# Patient Record
Sex: MALE | Employment: OTHER
[De-identification: names, ages, dates, MRNs, and addresses within clinical notes are randomized per-mention and may not be internally consistent; named-entity substitution may affect disease eponyms.]

---

## 2024-02-01 ENCOUNTER — HOSPITAL ENCOUNTER (OUTPATIENT)
Facility: HOSPITAL | Age: 54
Setting detail: RECURRING SERIES
Discharge: HOME | End: 2024-02-04
Payer: OTHER GOVERNMENT

## 2024-02-01 PROCEDURE — 97110 THERAPEUTIC EXERCISES: CPT

## 2024-02-01 PROCEDURE — 97161 PT EVAL LOW COMPLEX 20 MIN: CPT

## 2024-02-01 NOTE — PROGRESS NOTES
PT DAILY TREATMENT NOTE/LUMBAR EVAL     Patient Name: Magdiel Torres    Date: 2024    : 1970  Insurance: Payor: [employer] / Plan: [employer] / Product Type: *No Product type* /      Patient  verified yes     Visit #   Current / Total 1 12   Time   In / Out 4:40 5:15   Pain   In / Out 0/10 0/10   Subjective Functional Status/Changes: See below       Treatment Area: Other low back pain [M54.59]  Pain in left hip [M25.552]  SUBJECTIVE  Pain Level (0-10 scale): 0/10  []constant []intermittent []improving []worsening []no change since onset    Any medication changes, allergies to medications, adverse drug reactions, diagnosis change, or new procedure performed?: [x] No    [] Yes (see summary sheet for update)  Subjective functional status/changes:       Mechanism of Injury: on and off pain. Feels normal sometimes and then has pain for 2-3 weeks. Reports he was pretty active and not sure what sets it off. Started 7-8 years ago. With worsening over time. Pain down to his left calf at time. Denies feeling pain not numbness. Describes pulsing pain. Less pain with ice and pressure point socks help. Piriformis stretch helps a lot typically. Increased pain with prolonged sitting. Likes to exercise and golf. Likes to bike. 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 (see flow sheet as applicable)     Details if applicable:  see flow sheet          Details if applicable:            Details if applicable:            Details if applicable:            Details if applicable:

## 2024-02-02 NOTE — THERAPY EVALUATION
[facility]  [address] [place] [address] Ph:[phone] Fx: [phone]  Plan of Care / Statement of Necessity for Physical Therapy Services     Patient Name: Magdiel Torres : 1970   Medical   Diagnosis: Other low back pain [M54.59]  Pain in left hip [M25.552] Treatment Diagnosis: M25.552  LEFT HIP PAIN  and M54.59  OTHER LOWER BACK PAIN      Onset Date: 7-8 years ago Payor :  Payor:  EAST / Plan:  [employer] / Product Type: *No Product type* /    Referral Source: [facility] Start of Care (SOC): 2024   Prior Hospitalization: See medical history Provider #: [id number]   Prior Level of Function: Ind with ambulation, Ind with ADLs, working from home   Comorbidities: None reports     Assessment / key information:    Pt. Is a 53 year old male c/o back and low back pain that began 7-8 years ago and has worsened over time. He reports having bouts of increased pain in back and left leg that can last for 2-3 weeks and then go away. He reports over time these episodes of occurred more frequently. He has been exercising at home and takes frequent breaks when sitting at work. He reports left leg pain can go down to his calf at times. He presents with good lumbar AROM in all directions. No increase in radicular symptoms with repeated movements in standing or laying down. He has decreased left LE mobility during SLR and slump tests. He also demonstrates decreased left hip ext/abd/add strength at 4/5 on left compared to 4+/5 on right side. Positive Ely test bilaterally. He has tightness and tenderness to palpation over left lumbar paraspinals, QL, and glute med. Skilled PT is medically necessary in order to improve LE strength and core stability for decreased pain and improved quality of life.     Evaluation Complexity:  History:  LOW Complexity : Zero comorbidities / personal factors that will impact the outcome / POC;

## 2024-02-27 ENCOUNTER — APPOINTMENT (OUTPATIENT)
Facility: HOSPITAL | Age: 54
End: 2024-02-27
Payer: OTHER GOVERNMENT

## 2024-03-07 ENCOUNTER — HOSPITAL ENCOUNTER (OUTPATIENT)
Facility: HOSPITAL | Age: 54
Setting detail: RECURRING SERIES
Discharge: HOME | End: 2024-03-10
Payer: OTHER GOVERNMENT

## 2024-03-07 PROCEDURE — 97110 THERAPEUTIC EXERCISES: CPT

## 2024-03-07 PROCEDURE — 97112 NEUROMUSCULAR REEDUCATION: CPT

## 2024-03-07 NOTE — PROGRESS NOTES
Measures/Assessment    FOTO: 79 points  Left: hip MMT ext: 4+/5 abd: 4+/5 add: 4+/5  % improvement in symptoms: pt. Reports feeling all they way better  90/90 hamstring flexibility: 10 degrees     Assessment:  Pt. Is progressing well with physical therapy. FOTO score improved to 79 points indicating a significant improvement in function. He reports having less pain overall and improved tolerance with working. Left hip strength ext/abd/add has improved to 4+/5. He also demonstrates improving 90/90 hamstring flexibility at 10 degrees. He demonstrates good understanding of his HEP and was educated on continuing with his HEP while on hold from PT.     Patient will continue to benefit from skilled PT / OT services to modify and progress therapeutic interventions, analyze and address functional mobility deficits, analyze and address strength deficits, analyze and address soft tissue restrictions, analyze and cue for proper movement patterns, and analyze and modify for postural abnormalities to address functional deficits and attain remaining goals.    Progress toward goals / Updated goals:  [x]  See Progress Note/Recertification    PLAN  Pt. Will be placed on hold secondary to progress towards goals and will D/C if he continues to have good pain control     Kaiden Hernandez PT    3/7/2024    7:57 AM    Future Appointments   Date Time Provider Department Center   3/7/2024  5:20 PM Kaiden Hernandez PT [facility]